# Patient Record
Sex: MALE | Race: WHITE | URBAN - METROPOLITAN AREA
[De-identification: names, ages, dates, MRNs, and addresses within clinical notes are randomized per-mention and may not be internally consistent; named-entity substitution may affect disease eponyms.]

---

## 2022-03-08 PROBLEM — F41.8 PERFORMANCE ANXIETY: Status: ACTIVE | Noted: 2022-03-08

## 2022-03-08 PROBLEM — F98.8 ATTENTION DEFICIT DISORDER: Status: ACTIVE | Noted: 2022-03-08

## 2024-02-06 ENCOUNTER — TELEPHONE (OUTPATIENT)
Dept: PODIATRY | Facility: CLINIC | Age: 34
End: 2024-02-06

## 2024-02-06 PROBLEM — M72.0 DUPUYTREN'S CONTRACTURE OF HAND: Status: ACTIVE | Noted: 2024-02-06

## 2024-02-06 PROBLEM — M72.2 DUPUYTREN'S CONTRACTURE OF FOOT: Status: ACTIVE | Noted: 2024-02-06

## 2024-02-06 NOTE — TELEPHONE ENCOUNTER
Spoke to pt offered to schedule appointment per referral. Pt verbalized not ready   To schedule appointment with Podiatrist at this time, will return call to schedule appointment at  Later time.